# Patient Record
Sex: FEMALE | Race: WHITE | Employment: FULL TIME | URBAN - METROPOLITAN AREA
[De-identification: names, ages, dates, MRNs, and addresses within clinical notes are randomized per-mention and may not be internally consistent; named-entity substitution may affect disease eponyms.]

---

## 2018-04-27 ENCOUNTER — OFFICE VISIT (OUTPATIENT)
Dept: FAMILY MEDICINE CLINIC | Age: 48
End: 2018-04-27
Payer: COMMERCIAL

## 2018-04-27 VITALS
DIASTOLIC BLOOD PRESSURE: 68 MMHG | OXYGEN SATURATION: 98 % | HEART RATE: 78 BPM | HEIGHT: 64 IN | SYSTOLIC BLOOD PRESSURE: 120 MMHG | BODY MASS INDEX: 32.61 KG/M2 | WEIGHT: 191 LBS

## 2018-04-27 DIAGNOSIS — J45.20 MILD INTERMITTENT ASTHMA WITHOUT COMPLICATION: ICD-10-CM

## 2018-04-27 DIAGNOSIS — E16.2 HYPOGLYCEMIA: ICD-10-CM

## 2018-04-27 DIAGNOSIS — Z13.220 SCREENING CHOLESTEROL LEVEL: ICD-10-CM

## 2018-04-27 DIAGNOSIS — J30.89 CHRONIC NON-SEASONAL ALLERGIC RHINITIS, UNSPECIFIED TRIGGER: ICD-10-CM

## 2018-04-27 DIAGNOSIS — R63.5 WEIGHT GAIN, ABNORMAL: ICD-10-CM

## 2018-04-27 DIAGNOSIS — R53.82 CHRONIC FATIGUE: ICD-10-CM

## 2018-04-27 DIAGNOSIS — K62.9 FECAL INCONTINENCE DUE TO ANORECTAL DISORDER: ICD-10-CM

## 2018-04-27 DIAGNOSIS — R15.9 FECAL INCONTINENCE DUE TO ANORECTAL DISORDER: ICD-10-CM

## 2018-04-27 DIAGNOSIS — Z12.39 SCREENING FOR BREAST CANCER: ICD-10-CM

## 2018-04-27 DIAGNOSIS — K21.9 GASTROESOPHAGEAL REFLUX DISEASE WITHOUT ESOPHAGITIS: Primary | ICD-10-CM

## 2018-04-27 PROCEDURE — 99204 OFFICE O/P NEW MOD 45 MIN: CPT | Performed by: FAMILY MEDICINE

## 2018-04-27 RX ORDER — ALBUTEROL SULFATE 90 UG/1
2 AEROSOL, METERED RESPIRATORY (INHALATION) EVERY 4 HOURS PRN
Qty: 1 INHALER | Refills: 3 | Status: SHIPPED | OUTPATIENT
Start: 2018-04-27 | End: 2018-07-20 | Stop reason: SDUPTHER

## 2018-04-27 RX ORDER — OMEPRAZOLE 20 MG/1
20 CAPSULE, DELAYED RELEASE ORAL 2 TIMES DAILY
Qty: 30 CAPSULE | Refills: 3 | Status: SHIPPED | OUTPATIENT
Start: 2018-04-27 | End: 2018-07-20 | Stop reason: SDUPTHER

## 2018-04-27 RX ORDER — LORATADINE 10 MG/1
10 CAPSULE, LIQUID FILLED ORAL DAILY
Qty: 30 CAPSULE | Refills: 1 | Status: SHIPPED | OUTPATIENT
Start: 2018-04-27 | End: 2018-07-20 | Stop reason: SDUPTHER

## 2018-04-27 ASSESSMENT — ENCOUNTER SYMPTOMS
SHORTNESS OF BREATH: 0
EYES NEGATIVE: 1
HOARSE VOICE: 1
WHEEZING: 1
HEARTBURN: 1
CONSTIPATION: 0
COUGH: 0
CHOKING: 0
GLOBUS SENSATION: 1
DIARRHEA: 0
BLOOD IN STOOL: 0
ABDOMINAL PAIN: 0

## 2018-04-27 ASSESSMENT — PATIENT HEALTH QUESTIONNAIRE - PHQ9
2. FEELING DOWN, DEPRESSED OR HOPELESS: 0
1. LITTLE INTEREST OR PLEASURE IN DOING THINGS: 0
SUM OF ALL RESPONSES TO PHQ9 QUESTIONS 1 & 2: 0
SUM OF ALL RESPONSES TO PHQ QUESTIONS 1-9: 0

## 2018-06-15 ENCOUNTER — OFFICE VISIT (OUTPATIENT)
Dept: FAMILY MEDICINE CLINIC | Age: 48
End: 2018-06-15
Payer: COMMERCIAL

## 2018-06-15 ENCOUNTER — HOSPITAL ENCOUNTER (OUTPATIENT)
Age: 48
Setting detail: SPECIMEN
Discharge: HOME | End: 2018-06-15
Payer: COMMERCIAL

## 2018-06-15 VITALS
BODY MASS INDEX: 32.95 KG/M2 | HEART RATE: 72 BPM | WEIGHT: 193 LBS | HEIGHT: 64 IN | DIASTOLIC BLOOD PRESSURE: 78 MMHG | SYSTOLIC BLOOD PRESSURE: 122 MMHG

## 2018-06-15 DIAGNOSIS — N95.1 PERIMENOPAUSE: ICD-10-CM

## 2018-06-15 DIAGNOSIS — Z01.419 ENCOUNTER FOR GYNECOLOGICAL EXAMINATION WITHOUT ABNORMAL FINDING: Primary | ICD-10-CM

## 2018-06-15 DIAGNOSIS — J45.20 MILD INTERMITTENT ASTHMA WITHOUT COMPLICATION: ICD-10-CM

## 2018-06-15 DIAGNOSIS — K21.9 GASTROESOPHAGEAL REFLUX DISEASE WITHOUT ESOPHAGITIS: ICD-10-CM

## 2018-06-15 DIAGNOSIS — E66.09 CLASS 1 OBESITY DUE TO EXCESS CALORIES WITHOUT SERIOUS COMORBIDITY WITH BODY MASS INDEX (BMI) OF 33.0 TO 33.9 IN ADULT: ICD-10-CM

## 2018-06-15 DIAGNOSIS — J30.89 CHRONIC NON-SEASONAL ALLERGIC RHINITIS, UNSPECIFIED TRIGGER: ICD-10-CM

## 2018-06-15 PROCEDURE — 99396 PREV VISIT EST AGE 40-64: CPT | Performed by: FAMILY MEDICINE

## 2018-06-15 RX ORDER — MONTELUKAST SODIUM 10 MG/1
10 TABLET ORAL NIGHTLY
Qty: 30 TABLET | Refills: 3 | Status: SHIPPED | OUTPATIENT
Start: 2018-06-15 | End: 2018-07-20 | Stop reason: SDUPTHER

## 2018-06-15 ASSESSMENT — ENCOUNTER SYMPTOMS
COUGH: 0
CONSTIPATION: 0
HEARTBURN: 1
WHEEZING: 1
DIARRHEA: 0
EYES NEGATIVE: 1
BLOOD IN STOOL: 0
SHORTNESS OF BREATH: 0
ABDOMINAL PAIN: 0

## 2018-07-05 LAB — CYTOLOGY REPORT: NORMAL

## 2018-07-15 LAB
ABSOLUTE BASO #: 0.1 K/UL (ref 0–0.1)
ABSOLUTE EOS #: 0.1 K/UL (ref 0.1–0.4)
ABSOLUTE LYMPH #: 2.7 K/UL (ref 0.8–5.2)
ABSOLUTE MONO #: 0.6 K/UL (ref 0.1–0.9)
ABSOLUTE NEUT #: 3.5 K/UL (ref 1.3–9.1)
ALBUMIN SERPL-MCNC: 4.5 G/DL (ref 3.5–5.2)
ALK PHOSPHATASE: 79 U/L (ref 30–103)
ALT SERPL-CCNC: 31 U/L (ref 5–40)
ANION GAP SERPL CALCULATED.3IONS-SCNC: 13 MEQ/L (ref 10–19)
AST SERPL-CCNC: 23 U/L (ref 9–40)
BASOPHILS RELATIVE PERCENT: 0.7 %
BILIRUB SERPL-MCNC: 0.4 MG/DL
BUN BLDV-MCNC: 9 MG/DL (ref 8–23)
CALCIUM SERPL-MCNC: 9.3 MG/DL (ref 8.5–10.5)
CHLORIDE BLD-SCNC: 104 MEQ/L (ref 95–107)
CHOLESTEROL/HDL RATIO: 4.3
CHOLESTEROL: 177 MG/DL
CO2: 25 MEQ/L (ref 19–31)
CREAT SERPL-MCNC: 0.9 MG/DL (ref 0.6–1.3)
EGFR AFRICAN AMERICAN: 87.6 ML/MIN/1.73 M2
EGFR IF NONAFRICAN AMERICAN: 75.6 ML/MIN/1.73 M2
EOSINOPHILS RELATIVE PERCENT: 1.3 %
ESTRADIOL LEVEL: 32 PG/ML
FOLLICLE STIMULATING HORMONE: 73.4 IU/L
GLUCOSE: 91 MG/DL (ref 70–99)
HCT VFR BLD CALC: 45.3 % (ref 36–48)
HDLC SERPL-MCNC: 41 MG/DL
HEMOGLOBIN: 15.3 G/DL (ref 12–16)
LDL CHOLESTEROL CALCULATED: 110 MG/DL
LDL/HDL RATIO: 2.7
LH: 55.7 IU/L
LYMPHOCYTE %: 38.2 %
MCH RBC QN AUTO: 31.4 PG (ref 27–34)
MCHC RBC AUTO-ENTMCNC: 33.8 G/DL (ref 31–36)
MCV RBC AUTO: 93 FL (ref 80–100)
MONOCYTES # BLD: 9 %
NEUTROPHILS RELATIVE PERCENT: 50.5 %
PDW BLD-RTO: 13.1 % (ref 10.8–14.8)
PLATELETS: 248 K/UL (ref 150–450)
POTASSIUM SERPL-SCNC: 4.2 MEQ/L (ref 3.5–5.4)
RBC: 4.87 M/UL (ref 4–5.5)
SODIUM BLD-SCNC: 142 MEQ/L (ref 135–146)
T4 FREE: 1.04 NG/DL (ref 0.8–1.9)
TOTAL PROTEIN: 7.3 G/DL (ref 6.1–8.3)
TRIGL SERPL-MCNC: 132 MG/DL
TSH SERPL DL<=0.05 MIU/L-ACNC: 4.6 UIU/ML (ref 0.4–4.1)
VLDLC SERPL CALC-MCNC: 26 MG/DL
WBC: 7 K/UL (ref 3.7–10.8)

## 2018-07-20 ENCOUNTER — TELEPHONE (OUTPATIENT)
Dept: FAMILY MEDICINE CLINIC | Age: 48
End: 2018-07-20

## 2018-07-20 DIAGNOSIS — J30.89 CHRONIC NON-SEASONAL ALLERGIC RHINITIS, UNSPECIFIED TRIGGER: ICD-10-CM

## 2018-07-20 DIAGNOSIS — K21.9 GASTROESOPHAGEAL REFLUX DISEASE WITHOUT ESOPHAGITIS: ICD-10-CM

## 2018-07-20 DIAGNOSIS — J45.20 MILD INTERMITTENT ASTHMA WITHOUT COMPLICATION: ICD-10-CM

## 2018-07-20 RX ORDER — OMEPRAZOLE 20 MG/1
20 CAPSULE, DELAYED RELEASE ORAL 2 TIMES DAILY
Qty: 30 CAPSULE | Refills: 3 | Status: SHIPPED | OUTPATIENT
Start: 2018-07-20 | End: 2021-03-11

## 2018-07-20 RX ORDER — ALBUTEROL SULFATE 90 UG/1
2 AEROSOL, METERED RESPIRATORY (INHALATION) EVERY 4 HOURS PRN
Qty: 1 INHALER | Refills: 3 | Status: SHIPPED | OUTPATIENT
Start: 2018-07-20 | End: 2021-03-11

## 2018-07-20 RX ORDER — LORATADINE 10 MG/1
10 CAPSULE, LIQUID FILLED ORAL DAILY
Qty: 30 CAPSULE | Refills: 3 | Status: SHIPPED | OUTPATIENT
Start: 2018-07-20 | End: 2021-03-11

## 2018-07-20 RX ORDER — MONTELUKAST SODIUM 10 MG/1
10 TABLET ORAL NIGHTLY
Qty: 30 TABLET | Refills: 3 | Status: SHIPPED | OUTPATIENT
Start: 2018-07-20 | End: 2021-03-11

## 2018-07-20 NOTE — TELEPHONE ENCOUNTER
Pt states that in the past you prescribed her some medications that she had never had filled. She never picked up what was sent. She is requesting for all four of the medications listed on her med list to be refilled at [facility] Maintenance   Topic Date Due    Pneumococcal med risk (1 of 1 - PPSV23) [birth year]/1989    Flu vaccine (1) 09/01/2018    Cervical cancer screen  06/15/2021    DTaP/Tdap/Td vaccine (2 - Td) 09/04/2022    Lipid screen  07/14/2023    HIV screen  Completed             (applicable per patient's age: Cancer Screenings, Depression Screening, Fall Risk Screening, Immunizations)    LDL Calculated (mg/dL)   Date Value   07/14/2018 110     AST (U/L)   Date Value   07/14/2018 23     ALT (U/L)   Date Value   07/14/2018 31     BUN (mg/dL)   Date Value   07/14/2018 9      (goal A1C is < 7)   (goal LDL is <100) need 30-50% reduction from baseline     BP Readings from Last 3 Encounters:   06/15/18 122/78   04/27/18 120/68    (goal /80)      All Future Testing planned in [facility]:  Lab Frequency Next Occurrence   CBC Auto Differential Once 10/22/2018   Comprehensive Metabolic Panel Once 01/22/0344   Lipid Panel Once 10/22/2018   TSH with Reflex Once 10/22/2018   KEILY DIGITAL SCREEN W CAD BILATERAL Once 45/09/8485   Follicle Stimulating Hormone Once 12/25/2018   Luteinizing Hormone Once 12/25/2018   Estradiol Once 12/25/2018       Next Visit Date:  No future appointments.          Patient Active Problem List:     Hypoglycemia     Allergic rhinitis     Asthma     Obesity     Gastroesophageal reflux disease without esophagitis

## 2019-03-07 ENCOUNTER — OFFICE VISIT (OUTPATIENT)
Dept: FAMILY MEDICINE CLINIC | Age: 49
End: 2019-03-07
Payer: COMMERCIAL

## 2019-03-07 VITALS
DIASTOLIC BLOOD PRESSURE: 84 MMHG | BODY MASS INDEX: 32.27 KG/M2 | HEIGHT: 64 IN | WEIGHT: 189 LBS | SYSTOLIC BLOOD PRESSURE: 120 MMHG | HEART RATE: 84 BPM | TEMPERATURE: 99.5 F

## 2019-03-07 DIAGNOSIS — J45.20 MILD INTERMITTENT ASTHMA WITHOUT COMPLICATION: ICD-10-CM

## 2019-03-07 DIAGNOSIS — R50.9 FEVER AND CHILLS: Primary | ICD-10-CM

## 2019-03-07 DIAGNOSIS — J31.0 CHRONIC RHINITIS: ICD-10-CM

## 2019-03-07 LAB
INFLUENZA A ANTIBODY: NORMAL
INFLUENZA B ANTIBODY: NORMAL

## 2019-03-07 PROCEDURE — 99213 OFFICE O/P EST LOW 20 MIN: CPT | Performed by: FAMILY MEDICINE

## 2019-03-07 PROCEDURE — G8484 FLU IMMUNIZE NO ADMIN: HCPCS | Performed by: FAMILY MEDICINE

## 2019-03-07 PROCEDURE — 87804 INFLUENZA ASSAY W/OPTIC: CPT | Performed by: FAMILY MEDICINE

## 2019-03-07 PROCEDURE — 1036F TOBACCO NON-USER: CPT | Performed by: FAMILY MEDICINE

## 2019-03-07 PROCEDURE — G8427 DOCREV CUR MEDS BY ELIG CLIN: HCPCS | Performed by: FAMILY MEDICINE

## 2019-03-07 PROCEDURE — G8417 CALC BMI ABV UP PARAM F/U: HCPCS | Performed by: FAMILY MEDICINE

## 2019-03-07 ASSESSMENT — ENCOUNTER SYMPTOMS
DIARRHEA: 0
COUGH: 1
SORE THROAT: 1
RHINORRHEA: 1
NAUSEA: 0

## 2019-03-07 ASSESSMENT — PATIENT HEALTH QUESTIONNAIRE - PHQ9
1. LITTLE INTEREST OR PLEASURE IN DOING THINGS: 0
SUM OF ALL RESPONSES TO PHQ QUESTIONS 1-9: 0
SUM OF ALL RESPONSES TO PHQ9 QUESTIONS 1 & 2: 0
SUM OF ALL RESPONSES TO PHQ QUESTIONS 1-9: 0
2. FEELING DOWN, DEPRESSED OR HOPELESS: 0

## 2021-03-10 ENCOUNTER — NURSE TRIAGE (OUTPATIENT)
Dept: OTHER | Facility: CLINIC | Age: 51
End: 2021-03-10

## 2021-03-10 NOTE — TELEPHONE ENCOUNTER
Patient called [facility] pre-service center [facility])  with red flag complaint. Brief description of triage: Pt calls in reporting episodes of vertigo this morning as well as head and ear \"fullness\". Triage indicates for patient to be seen today. Care advice provided, patient verbalizes understanding; denies any other questions or concerns; instructed to call back for any new or worsening symptoms. Writer provided warm transfer to Erendira Keita at [facility] for appointment scheduling. Attention Provider: Thank you for allowing me to participate in the care of your patient. The patient was connected to triage in response to information provided to the [facility]. Please do not respond through this encounter as the response is not directed to a shared pool. Reason for Disposition   Patient wants to be seen    Answer Assessment - Initial Assessment Questions  1. DESCRIPTION: \"Describe your dizziness. \"      Pt describes that this morning at 0300 she attempted to get up and felt very dizzy. Since then pt has had intermittent spells of vertigo. Pt reports her allergies are flared up right now and her ears feel congested. 2. LIGHTHEADED: \"Do you feel lightheaded? \" (e.g., somewhat faint, woozy, weak upon standing)      Denies    3. VERTIGO: \"Do you feel like either you or the room is spinning or tilting? \" (i.e. vertigo)      Yes, pt reports 5 years ago she had severe vertigo and was treated with medication    4. SEVERITY: \"How bad is it? \"  \"Do you feel like you are going to faint? \" \"Can you stand and walk? \"    - MILD - walking normally    - MODERATE - interferes with normal activities (e.g., work, school)     - SEVERE - unable to stand, requires support to walk, feels like passing out now. Mild currently    5. ONSET:  \"When did the dizziness begin? \"      This morning    6. AGGRAVATING FACTORS: \"Does anything make it worse? \" (e.g., standing, change in head position)      Movement such as putting head back in the shower, getting up and down from sitting position, closing eyes    7. HEART RATE: \"Can you tell me your heart rate? \" \"How many beats in 15 seconds? \"  (Note: not all patients can do this)        Not asked    8. CAUSE: \"What do you think is causing the dizziness? \"      Vertigo, possibly related to an ear infection    9. RECURRENT SYMPTOM: \"Have you had dizziness before? \" If so, ask: \"When was the last time? \" \"What happened that time? \"      See above    10. OTHER SYMPTOMS: \"Do you have any other symptoms? \" (e.g., fever, chest pain, vomiting, diarrhea, bleeding)        Denies all listed. Pt reports that her head feels \"congested\" but not sinus congestion, more like \"air\" per pt report. 11. PREGNANCY: \"Is there any chance you are pregnant? \" \"When was your last menstrual period? \"        Denies    Pt reports she needs a refill of Zyzal    Protocols used: [id number]

## 2021-03-11 ENCOUNTER — OFFICE VISIT (OUTPATIENT)
Dept: FAMILY MEDICINE CLINIC | Age: 51
End: 2021-03-11
Payer: COMMERCIAL

## 2021-03-11 VITALS
BODY MASS INDEX: 33.63 KG/M2 | DIASTOLIC BLOOD PRESSURE: 80 MMHG | WEIGHT: 197 LBS | RESPIRATION RATE: 18 BRPM | OXYGEN SATURATION: 98 % | HEIGHT: 64 IN | TEMPERATURE: 98.1 F | SYSTOLIC BLOOD PRESSURE: 124 MMHG | HEART RATE: 72 BPM

## 2021-03-11 DIAGNOSIS — R42 DIZZINESS: ICD-10-CM

## 2021-03-11 DIAGNOSIS — H83.03 LABYRINTHITIS OF BOTH EARS: ICD-10-CM

## 2021-03-11 DIAGNOSIS — J30.89 NON-SEASONAL ALLERGIC RHINITIS, UNSPECIFIED TRIGGER: Primary | ICD-10-CM

## 2021-03-11 PROCEDURE — G8484 FLU IMMUNIZE NO ADMIN: HCPCS | Performed by: FAMILY MEDICINE

## 2021-03-11 PROCEDURE — 3017F COLORECTAL CA SCREEN DOC REV: CPT | Performed by: FAMILY MEDICINE

## 2021-03-11 PROCEDURE — 1036F TOBACCO NON-USER: CPT | Performed by: FAMILY MEDICINE

## 2021-03-11 PROCEDURE — G8419 CALC BMI OUT NRM PARAM NOF/U: HCPCS | Performed by: FAMILY MEDICINE

## 2021-03-11 PROCEDURE — G8427 DOCREV CUR MEDS BY ELIG CLIN: HCPCS | Performed by: FAMILY MEDICINE

## 2021-03-11 PROCEDURE — 99213 OFFICE O/P EST LOW 20 MIN: CPT | Performed by: FAMILY MEDICINE

## 2021-03-11 RX ORDER — LEVOCETIRIZINE DIHYDROCHLORIDE 5 MG/1
5 TABLET, FILM COATED ORAL NIGHTLY
Qty: 30 TABLET | Refills: 5 | Status: SHIPPED | OUTPATIENT
Start: 2021-03-11 | End: 2021-10-28

## 2021-03-11 RX ORDER — AMOXICILLIN 875 MG/1
875 TABLET, COATED ORAL 2 TIMES DAILY
Qty: 20 TABLET | Refills: 0 | Status: SHIPPED | OUTPATIENT
Start: 2021-03-11 | End: 2021-03-21

## 2021-03-11 RX ORDER — MECLIZINE HYDROCHLORIDE 25 MG/1
25 TABLET ORAL 3 TIMES DAILY PRN
Qty: 30 TABLET | Refills: 0 | Status: SHIPPED | OUTPATIENT
Start: 2021-03-11 | End: 2021-03-21

## 2021-03-11 SDOH — ECONOMIC STABILITY: FOOD INSECURITY: WITHIN THE PAST 12 MONTHS, YOU WORRIED THAT YOUR FOOD WOULD RUN OUT BEFORE YOU GOT MONEY TO BUY MORE.: NEVER TRUE

## 2021-03-11 SDOH — ECONOMIC STABILITY: FOOD INSECURITY: WITHIN THE PAST 12 MONTHS, THE FOOD YOU BOUGHT JUST DIDN'T LAST AND YOU DIDN'T HAVE MONEY TO GET MORE.: NEVER TRUE

## 2021-03-11 SDOH — ECONOMIC STABILITY: TRANSPORTATION INSECURITY
IN THE PAST 12 MONTHS, HAS LACK OF TRANSPORTATION KEPT YOU FROM MEETINGS, WORK, OR FROM GETTING THINGS NEEDED FOR DAILY LIVING?: NO

## 2021-03-11 SDOH — ECONOMIC STABILITY: TRANSPORTATION INSECURITY
IN THE PAST 12 MONTHS, HAS THE LACK OF TRANSPORTATION KEPT YOU FROM MEDICAL APPOINTMENTS OR FROM GETTING MEDICATIONS?: NO

## 2021-03-11 SDOH — ECONOMIC STABILITY: INCOME INSECURITY: HOW HARD IS IT FOR YOU TO PAY FOR THE VERY BASICS LIKE FOOD, HOUSING, MEDICAL CARE, AND HEATING?: NOT HARD AT ALL

## 2021-03-11 ASSESSMENT — ENCOUNTER SYMPTOMS
ALLERGIC/IMMUNOLOGIC NEGATIVE: 1
SINUS PRESSURE: 1
SHORTNESS OF BREATH: 0
EYES NEGATIVE: 1
COUGH: 0
ABDOMINAL PAIN: 0
CONSTIPATION: 0
DIARRHEA: 0

## 2021-03-11 ASSESSMENT — PATIENT HEALTH QUESTIONNAIRE - PHQ9
SUM OF ALL RESPONSES TO PHQ QUESTIONS 1-9: 0
1. LITTLE INTEREST OR PLEASURE IN DOING THINGS: 0
SUM OF ALL RESPONSES TO PHQ9 QUESTIONS 1 & 2: 0

## 2021-03-11 NOTE — PROGRESS NOTES
Depression screening done  Chief Complaint   Patient presents with    Dizziness     03/10/21 when getting up or puts her back.      Otalgia     ear pressure for a week

## 2021-03-11 NOTE — PROGRESS NOTES
[facility]  [facility]  [address]  [facility] [address]  Dept: [phone]    3/11/2021    CHIEF COMPLAINT    Chief Complaint   Patient presents with    Dizziness     03/10/21 when getting up or puts her back.  Otalgia     ear pressure for a week        CYRUS Gray is a 48 y.o. female who presents   Chief Complaint   Patient presents with    Dizziness     03/10/21 when getting up or puts her back.  Otalgia     ear pressure for a week    . Dizziness  This is a new problem. The current episode started yesterday. The problem occurs intermittently. The problem has been unchanged. Associated symptoms include congestion. Pertinent negatives include no abdominal pain, chest pain, coughing, fatigue, fever or headaches. The symptoms are aggravated by standing and bending. She has tried nothing for the symptoms. The treatment provided mild relief. Otalgia   There is pain in both ears. This is a new problem. The current episode started yesterday. The problem occurs constantly. The problem has been unchanged. There has been no fever. Pertinent negatives include no abdominal pain, coughing, diarrhea, ear discharge, headaches or hearing loss. Vitals:    03/11/21 1152   BP: 124/80   Site: Left Upper Arm   Position: Sitting   Cuff Size: Medium Adult   Pulse: 72   Resp: 18   Temp: 98.1 °F (36.7 °C)   TempSrc: Temporal   SpO2: 98%   Weight: 197 lb (89.4 kg)   Height: 5' 4\" (1.626 m)       REVIEW OF SYSTEMS    Review of Systems   Constitutional: Negative for fatigue, fever and unexpected weight change. HENT: Positive for congestion, ear pain and sinus pressure. Negative for ear discharge and hearing loss. Eyes: Negative. Respiratory: Negative for cough and shortness of breath. Cardiovascular: Negative for chest pain and leg swelling. Gastrointestinal: Negative for abdominal pain, constipation and diarrhea.    Endocrine: Concern    None   Social History Narrative    None       SURGICAL HISTORY    Past Surgical History:   Procedure Laterality Date    DILATION AND CURETTAGE OF UTERUS  1996    spab       CURRENT MEDICATIONS    Current Outpatient Medications   Medication Sig Dispense Refill    amoxicillin (AMOXIL) 875 MG tablet Take 1 tablet by mouth 2 times daily for 10 days 20 tablet 0    levocetirizine (XYZAL) 5 MG tablet Take 1 tablet by mouth nightly 30 tablet 5    meclizine (ANTIVERT) 25 MG tablet Take 1 tablet by mouth 3 times daily as needed for Dizziness 30 tablet 0     No current facility-administered medications for this visit. ALLERGIES    No Known Allergies    PHYSICAL EXAM   Physical Exam  Vitals signs reviewed. Constitutional:       Appearance: She is well-developed. She is obese. HENT:      Head: Normocephalic. Right Ear: A middle ear effusion is present. Left Ear: A middle ear effusion is present. Nose: Congestion present. Right Sinus: No maxillary sinus tenderness or frontal sinus tenderness. Left Sinus: No maxillary sinus tenderness or frontal sinus tenderness. Eyes:      Conjunctiva/sclera: Conjunctivae normal.      Pupils: Pupils are equal, round, and reactive to light. Neck:      Musculoskeletal: Normal range of motion and neck supple. Thyroid: No thyromegaly. Cardiovascular:      Rate and Rhythm: Normal rate and regular rhythm. Heart sounds: Normal heart sounds. No murmur. Pulmonary:      Effort: Pulmonary effort is normal.      Breath sounds: Normal breath sounds. No wheezing or rales. Abdominal:      Palpations: Abdomen is soft. Musculoskeletal: Normal range of motion. General: No tenderness or deformity. Lymphadenopathy:      Cervical: No cervical adenopathy. Skin:     General: Skin is warm and dry. Neurological:      Mental Status: She is alert and oriented to person, place, and time.    Psychiatric:         Mood and Affect: Mood

## 2021-04-16 ENCOUNTER — HOSPITAL ENCOUNTER (OUTPATIENT)
Age: 51
Setting detail: SPECIMEN
Discharge: HOME | End: 2021-04-16
Payer: COMMERCIAL

## 2021-04-16 ENCOUNTER — OFFICE VISIT (OUTPATIENT)
Dept: FAMILY MEDICINE CLINIC | Age: 51
End: 2021-04-16
Payer: COMMERCIAL

## 2021-04-16 VITALS
HEART RATE: 92 BPM | RESPIRATION RATE: 16 BRPM | TEMPERATURE: 97.4 F | SYSTOLIC BLOOD PRESSURE: 118 MMHG | WEIGHT: 197.2 LBS | OXYGEN SATURATION: 98 % | HEIGHT: 64 IN | BODY MASS INDEX: 33.67 KG/M2 | DIASTOLIC BLOOD PRESSURE: 78 MMHG

## 2021-04-16 DIAGNOSIS — J45.20 MILD INTERMITTENT ASTHMA WITHOUT COMPLICATION: ICD-10-CM

## 2021-04-16 DIAGNOSIS — Z13.228 SCREENING FOR ENDOCRINE, METABOLIC AND IMMUNITY DISORDER: ICD-10-CM

## 2021-04-16 DIAGNOSIS — M25.562 CHRONIC PAIN OF LEFT KNEE: ICD-10-CM

## 2021-04-16 DIAGNOSIS — G89.29 CHRONIC PAIN OF LEFT KNEE: ICD-10-CM

## 2021-04-16 DIAGNOSIS — Z13.0 SCREENING FOR ENDOCRINE, METABOLIC AND IMMUNITY DISORDER: ICD-10-CM

## 2021-04-16 DIAGNOSIS — K21.9 GASTROESOPHAGEAL REFLUX DISEASE WITHOUT ESOPHAGITIS: ICD-10-CM

## 2021-04-16 DIAGNOSIS — Z13.29 SCREENING FOR ENDOCRINE, METABOLIC AND IMMUNITY DISORDER: ICD-10-CM

## 2021-04-16 DIAGNOSIS — R63.5 WEIGHT GAIN: ICD-10-CM

## 2021-04-16 DIAGNOSIS — R79.89 ELEVATED TSH: ICD-10-CM

## 2021-04-16 DIAGNOSIS — Z00.00 ANNUAL PHYSICAL EXAM: ICD-10-CM

## 2021-04-16 DIAGNOSIS — Z12.11 SCREEN FOR COLON CANCER: ICD-10-CM

## 2021-04-16 DIAGNOSIS — Z13.220 SCREENING FOR LIPID DISORDERS: ICD-10-CM

## 2021-04-16 DIAGNOSIS — E66.09 CLASS 1 OBESITY DUE TO EXCESS CALORIES WITHOUT SERIOUS COMORBIDITY WITH BODY MASS INDEX (BMI) OF 33.0 TO 33.9 IN ADULT: ICD-10-CM

## 2021-04-16 DIAGNOSIS — Z00.00 ANNUAL PHYSICAL EXAM: Primary | ICD-10-CM

## 2021-04-16 DIAGNOSIS — J34.89 NASAL SORE: ICD-10-CM

## 2021-04-16 DIAGNOSIS — J30.89 NON-SEASONAL ALLERGIC RHINITIS, UNSPECIFIED TRIGGER: ICD-10-CM

## 2021-04-16 DIAGNOSIS — Z12.31 ENCOUNTER FOR SCREENING MAMMOGRAM FOR MALIGNANT NEOPLASM OF BREAST: ICD-10-CM

## 2021-04-16 PROCEDURE — 36415 COLL VENOUS BLD VENIPUNCTURE: CPT | Performed by: FAMILY MEDICINE

## 2021-04-16 PROCEDURE — 99396 PREV VISIT EST AGE 40-64: CPT | Performed by: FAMILY MEDICINE

## 2021-04-16 ASSESSMENT — ENCOUNTER SYMPTOMS
ALLERGIC/IMMUNOLOGIC NEGATIVE: 1
SHORTNESS OF BREATH: 1
CONSTIPATION: 0
ABDOMINAL PAIN: 0
CHEST TIGHTNESS: 1
EYES NEGATIVE: 1
DIARRHEA: 0
BLOOD IN STOOL: 0
COUGH: 0

## 2021-04-16 ASSESSMENT — PATIENT HEALTH QUESTIONNAIRE - PHQ9
SUM OF ALL RESPONSES TO PHQ QUESTIONS 1-9: 0
SUM OF ALL RESPONSES TO PHQ QUESTIONS 1-9: 0
2. FEELING DOWN, DEPRESSED OR HOPELESS: 0
SUM OF ALL RESPONSES TO PHQ QUESTIONS 1-9: 0
SUM OF ALL RESPONSES TO PHQ9 QUESTIONS 1 & 2: 0

## 2021-04-16 NOTE — PROGRESS NOTES
[facility]  [facility]  [address]  [facility] [address]  Dept: [phone]    4/16/2021    CHIEF COMPLAINT    Chief Complaint   Patient presents with    Annual Exam    Weight Management     doesn't like her weight    Knee Pain     both knees hurt       HPI    Gilson Drake is a 48 y.o. female who presents   Chief Complaint   Patient presents with    Annual Exam    Weight Management     doesn't like her weight    Knee Pain     both knees hurt   . Here for annual physical exam.  Has multiple concerns including inability to lose weight, chronic left knee pain which is worsening, allergies with postnasal drip and tightness in her chest.  Takes Xyzal which is helpful but makes her sleepy. Has used albuterol but it does not seem to be effective. History of GERD not currently on medication. Labs done several years ago showed an elevated TSH and normal free T4. She has had normal lipids in the past and normal blood sugar in the past.  Working as a psychologist.  Still has teenaged children at home. Parents are elderly and her father currently is on a ventilator for Covid infection. She has been tested for Covid and was negative. Has been menopausal for at least 3 years. Last Pap was in 2018. Due for mammogram and colon screening. Vitals:    04/16/21 0829   BP: 118/78   Site: Left Upper Arm   Position: Sitting   Cuff Size: Medium Adult   Pulse: 92   Resp: 16   Temp: 97.4 °F (36.3 °C)   TempSrc: Temporal   SpO2: 98%   Weight: 197 lb 3.2 oz (89.4 kg)   Height: 5' 4\" (1.626 m)       REVIEW OF SYSTEMS    Review of Systems   Constitutional: Positive for unexpected weight change. Negative for fatigue and fever. HENT: Positive for congestion and postnasal drip. Recurrent sores in her nose that seem to migrate from place to place. She has some pustular discharge from them at times. Eyes: Negative.     Respiratory: Positive for chest tightness and shortness of breath. Negative for cough. Mild symptoms of chest tightness and sometimes feeling like she cannot get a full breath. Has tried albuterol without relief. Cardiovascular: Negative for chest pain, palpitations and leg swelling. Gastrointestinal: Negative for abdominal pain, blood in stool, constipation and diarrhea. Endocrine: Negative. Genitourinary: Negative for frequency, urgency and vaginal bleeding. Musculoskeletal: Positive for arthralgias (bilat knee pain). Left knee pain, worsening. Painful going up steps   Skin: Negative. Allergic/Immunologic: Negative. Neurological: Negative for dizziness and headaches. Hematological: Negative. Psychiatric/Behavioral: Negative for sleep disturbance. The patient is not nervous/anxious.         PAST MEDICAL HISTORY    Past Medical History:   Diagnosis Date    Allergic rhinitis     Asthma     Gastroesophageal reflux disease without esophagitis     Hypoglycemia     Obesity        FAMILY HISTORY    Family History   Problem Relation Age of Onset    Diabetes Mother     Coronary Art Dis Mother     High Blood Pressure Mother     Other Mother         covid    COPD Mother         nonsmoker    COPD Father         smoker    High Blood Pressure Father     Vision Loss Father         Fuchs dystrophy of eye    Other Father         covid, on vent 4/2021, Fuchs dystrophy of eye    Other Sister         Fuchs dystrophy of eye    Cancer Brother         basal and squamous    Asthma Daughter     Alcohol Abuse Brother         covid    Other Brother         Fuchs dystrophy of eye       SOCIAL HISTORY    Social History     Socioeconomic History    Marital status:      Spouse name: None    Number of children: None    Years of education: None    Highest education level: None   Occupational History    Occupation: clinical therapist   Social Needs    Financial resource strain: Not hard at all   T[place]-Colette insecurity     Worry: Never true     Inability: Never true    Transportation needs     Medical: No     Non-medical: No   Tobacco Use    Smoking status: Never Smoker    Smokeless tobacco: Never Used   Substance and Sexual Activity    Alcohol use: No    Drug use: No    Sexual activity: Yes     Partners: Male   Lifestyle    Physical activity     Days per week: None     Minutes per session: None    Stress: None   Relationships    Social connections     Talks on phone: None     Gets together: None     Attends [employer] service: None     Active member of club or organization: None     Attends meetings of clubs or organizations: None     Relationship status: None    Intimate partner violence     Fear of current or ex partner: None     Emotionally abused: None     Physically abused: None     Forced sexual activity: None   Other Topics Concern    None   Social History Narrative    None       SURGICAL HISTORY    Past Surgical History:   Procedure Laterality Date    DILATION AND CURETTAGE OF UTERUS  1996    spab       CURRENT MEDICATIONS    Current Outpatient Medications   Medication Sig Dispense Refill    mupirocin (BACTROBAN) 2 % ointment Apply 3 times daily. 15 g 1    levocetirizine (XYZAL) 5 MG tablet Take 1 tablet by mouth nightly 30 tablet 5     No current facility-administered medications for this visit. ALLERGIES    No Known Allergies    PHYSICAL EXAM   Physical Exam  Vitals signs reviewed. Constitutional:       Appearance: She is well-developed. She is obese. HENT:      Head: Normocephalic. Eyes:      Conjunctiva/sclera: Conjunctivae normal.      Pupils: Pupils are equal, round, and reactive to light. Neck:      Musculoskeletal: Normal range of motion and neck supple. Thyroid: No thyromegaly. Cardiovascular:      Rate and Rhythm: Normal rate and regular rhythm. Heart sounds: Normal heart sounds. No murmur.    Pulmonary:      Effort: Pulmonary effort is normal.      Breath sounds: Normal breath sounds. No wheezing or rales. Abdominal:      Palpations: Abdomen is soft. Tenderness: There is no abdominal tenderness. There is no guarding or rebound. Musculoskeletal: Normal range of motion. General: Tenderness (  Left knee, global) present. No deformity. Lymphadenopathy:      Cervical: No cervical adenopathy. Skin:     General: Skin is warm and dry. Neurological:      Mental Status: She is alert and oriented to person, place, and time. Psychiatric:         Mood and Affect: Mood normal.         Behavior: Behavior normal.         Thought Content: Thought content normal.         Judgment: Judgment normal.         ASSESSMENT/PLAN  1. Annual physical exam  Check labs, colon screening breast screening. Continue trying to follow healthy diet and get regular exercise  - Comprehensive Metabolic Panel; Future  - Lipid Panel; Future    2. Encounter for screening mammogram for malignant neoplasm of breast    - KEILY DIGITAL SCREEN W OR WO CAD BILATERAL; Future    3. Screen for colon cancer    - Cologuard; Future    4. Elevated TSH  Recheck levels and start supplement if indicated  - T4, Free; Future  - TSH without Reflex; Future    5. Chronic pain of left knee  Continue to limit activity as needed and see orthopedist  - [address] [place] [address] and Sports Medicine    6. Non-seasonal allergic rhinitis, unspecified trigger  Suggested trying a different antihistamine due to drowsiness from Xyzal.    7. Mild intermittent asthma without complication  Currently stable. Albuterol not helpful. Consider referral to pulmonologist if symptoms persist or worsen    8. Gastroesophageal reflux disease without esophagitis  Given patient education on reflux precautions. She did not want to take any medication    9. Class 1 obesity due to excess calories without serious comorbidity with body mass index (BMI) of 33.0 to 33.9 in adult  Continue exercise efforts    10.  Weight gain  Checking labs  - Comprehensive Metabolic Panel; Future    11. Screening for lipid disorders    - Lipid Panel; Future    12. Screening for endocrine, metabolic and immunity disorder    - Comprehensive Metabolic Panel; Future    13. Nasal sore  If not improving may try oral antibiotic and/or referral to ENT  - mupirocin (BACTROBAN) 2 % ointment; Apply 3 times daily. Dispense: 15 g; Refill: 1       Taya received counseling on the following healthy behaviors: nutrition, exercise and medication adherence  Reviewed prior labs and health maintenance. Continue current medications, diet and exercise. Discussed use, benefit, and side effects of prescribed medications. Barriers to medication compliance addressed. Patient given educational materials - see patient instructions. All patient questions answered. Patient voiced understanding.       Return for gyn exam.        Electronically signed by Josy Dong MD on 4/16/21 at 8:39 AM EDT

## 2021-04-16 NOTE — PROGRESS NOTES
Depression screening done  Nose sores, raspy voice and she feels her lungs. Has trouble swallowing, food gets caught.  Snores a lot   Chief Complaint   Patient presents with    Annual Exam    Weight Management     doesn't like her weight    Knee Pain     both knees hurt

## 2021-04-17 LAB
ALBUMIN SERPL-MCNC: 4.5 G/DL (ref 3.5–5.2)
ALBUMIN/GLOBULIN RATIO: 1.6 (ref 1–2.5)
ALP BLD-CCNC: 69 U/L (ref 35–104)
ALT SERPL-CCNC: 24 U/L (ref 5–33)
ANION GAP SERPL CALCULATED.3IONS-SCNC: 13 MMOL/L (ref 9–17)
AST SERPL-CCNC: 25 U/L
BILIRUB SERPL-MCNC: 0.28 MG/DL (ref 0.3–1.2)
BUN BLDV-MCNC: 10 MG/DL (ref 6–20)
BUN/CREAT BLD: ABNORMAL (ref 9–20)
CALCIUM SERPL-MCNC: 9.2 MG/DL (ref 8.6–10.4)
CHLORIDE BLD-SCNC: 110 MMOL/L (ref 98–107)
CHOLESTEROL/HDL RATIO: 3.5
CHOLESTEROL: 172 MG/DL
CO2: 21 MMOL/L (ref 20–31)
CREAT SERPL-MCNC: 0.76 MG/DL (ref 0.5–0.9)
GFR AFRICAN AMERICAN: >60 ML/MIN
GFR NON-AFRICAN AMERICAN: >60 ML/MIN
GFR SERPL CREATININE-BSD FRML MDRD: ABNORMAL ML/MIN/{1.73_M2}
GFR SERPL CREATININE-BSD FRML MDRD: ABNORMAL ML/MIN/{1.73_M2}
GLUCOSE BLD-MCNC: 90 MG/DL (ref 70–99)
HDLC SERPL-MCNC: 49 MG/DL
LDL CHOLESTEROL: 106 MG/DL (ref 0–130)
POTASSIUM SERPL-SCNC: 4.5 MMOL/L (ref 3.7–5.3)
SODIUM BLD-SCNC: 144 MMOL/L (ref 135–144)
THYROXINE, FREE: 1.09 NG/DL (ref 0.93–1.7)
TOTAL PROTEIN: 7.4 G/DL (ref 6.4–8.3)
TRIGL SERPL-MCNC: 83 MG/DL
TSH SERPL DL<=0.05 MIU/L-ACNC: 3.85 MIU/L (ref 0.3–5)
VLDLC SERPL CALC-MCNC: NORMAL MG/DL (ref 1–30)

## 2021-05-03 DIAGNOSIS — Z12.11 SCREEN FOR COLON CANCER: ICD-10-CM

## 2021-08-10 ENCOUNTER — TELEPHONE (OUTPATIENT)
Dept: FAMILY MEDICINE CLINIC | Age: 51
End: 2021-08-10

## 2021-08-10 DIAGNOSIS — Z12.39 ENCOUNTER FOR SCREENING FOR MALIGNANT NEOPLASM OF BREAST, UNSPECIFIED SCREENING MODALITY: Primary | ICD-10-CM

## 2021-08-10 NOTE — TELEPHONE ENCOUNTER
----- Message from Heather Ramirez sent at 8/10/2021  2:00 PM EDT -----  Subject: Referral Request    QUESTIONS   Reason for referral request? patient had script order for Mammogram   21  21. Please place new order for Mammogram and fax over   to [facility] please fax [phone]. Has the physician seen you for this condition before? No   Preferred Specialist (if applicable)? Do you already have an appointment scheduled? No  Additional Information for Provider?   ---------------------------------------------------------------------------  --------------  CALL BACK INFO  What is the best way for the office to contact you? OK to leave message on   voicemail  Preferred Call Back Phone Number?  [phone]

## 2021-09-17 ENCOUNTER — TELEPHONE (OUTPATIENT)
Dept: FAMILY MEDICINE CLINIC | Age: 51
End: 2021-09-17

## 2021-09-17 DIAGNOSIS — S99.922A TOE INJURY, LEFT, INITIAL ENCOUNTER: Primary | ICD-10-CM

## 2021-09-21 DIAGNOSIS — Z12.39 ENCOUNTER FOR SCREENING FOR MALIGNANT NEOPLASM OF BREAST, UNSPECIFIED SCREENING MODALITY: ICD-10-CM

## 2021-09-22 ENCOUNTER — TELEPHONE (OUTPATIENT)
Dept: FAMILY MEDICINE CLINIC | Age: 51
End: 2021-09-22

## 2021-09-22 DIAGNOSIS — S99.922A TOE INJURY, LEFT, INITIAL ENCOUNTER: Primary | ICD-10-CM

## 2021-09-22 DIAGNOSIS — S99.922A TOE INJURY, LEFT, INITIAL ENCOUNTER: ICD-10-CM

## 2021-09-22 NOTE — TELEPHONE ENCOUNTER
lmovm for the patient to call the office.    Will give results when she calls back     The results are scanned into the chart

## 2021-09-23 NOTE — TELEPHONE ENCOUNTER
Pt called back regarding message:  Test results: 9.17.2021  Left  Toes 3 v Left. Pt stated:   Please, place referral based on your recommendation. You stated: Podiatrist or [facility] Orth[facility] on results.

## 2021-10-28 ENCOUNTER — OFFICE VISIT (OUTPATIENT)
Dept: PODIATRY | Age: 51
End: 2021-10-28
Payer: COMMERCIAL

## 2021-10-28 VITALS — WEIGHT: 197 LBS | RESPIRATION RATE: 16 BRPM | HEIGHT: 64 IN | BODY MASS INDEX: 33.63 KG/M2

## 2021-10-28 DIAGNOSIS — M25.572 TOE JOINT PAIN, LEFT: Primary | ICD-10-CM

## 2021-10-28 PROCEDURE — 3017F COLORECTAL CA SCREEN DOC REV: CPT | Performed by: PODIATRIST

## 2021-10-28 PROCEDURE — 1036F TOBACCO NON-USER: CPT | Performed by: PODIATRIST

## 2021-10-28 PROCEDURE — G8427 DOCREV CUR MEDS BY ELIG CLIN: HCPCS | Performed by: PODIATRIST

## 2021-10-28 PROCEDURE — G8417 CALC BMI ABV UP PARAM F/U: HCPCS | Performed by: PODIATRIST

## 2021-10-28 PROCEDURE — G8484 FLU IMMUNIZE NO ADMIN: HCPCS | Performed by: PODIATRIST

## 2021-10-28 PROCEDURE — 99203 OFFICE O/P NEW LOW 30 MIN: CPT | Performed by: PODIATRIST

## 2021-10-28 NOTE — PROGRESS NOTES
[facility]  [facility]  [address]  Dept: [phone]  Dept Fax: [phone]    NEW PATIENT PROGRESS NOTE  Date of patient's visit: 10/28/2021  Patient's Name:  Emmanuel Damon YOB: 1970            Patient Care Team:  Germaine Hays MD as PCP - General (Family Medicine)  Germaine Hays MD as PCP - [facility] Provider  Maximino Vieira DPM as Physician (Podiatry)        Chief Complaint   Patient presents with    New Patient    Toe Pain     left foot all toes x2 months         HPI:   Emmanuel Damon is a 46 y.o. female who presents to the office today complaining of left foot multiple toe injury. Symptoms began 2 month(s) ago. Patient relates pain is present. Pain is rated 1 out of 10 and is described as intermittent, mild. Treatments prior to today's visit include: x rays shoe modification   Currently denies F/C/N/V.  Pt's primary care physician is Germaine Hays MD last seen 4/16/2021     No Known Allergies    Past Medical History:   Diagnosis Date    Allergic rhinitis     Asthma     Gastroesophageal reflux disease without esophagitis     Hypoglycemia     Obesity        Prior to Admission medications    Not on File       Past Surgical History:   Procedure Laterality Date    DILATION AND CURETTAGE OF UTERUS  1996    spab       Family History   Problem Relation Age of Onset    Diabetes Mother     Coronary Art Dis Mother     High Blood Pressure Mother     Other Mother         covid    COPD Mother         nonsmoker    COPD Father         smoker    High Blood Pressure Father     Vision Loss Father         Fuchs dystrophy of eye    Other Father         covid, on vent 4/2021, Fuchs dystrophy of eye    Other Sister         Fuchs dystrophy of eye    Cancer Brother         basal and squamous    Asthma Daughter     Alcohol Abuse Brother         covid    Other Brother         Fuchs dystrophy of eye Social History     Tobacco Use    Smoking status: Never Smoker    Smokeless tobacco: Never Used   Substance Use Topics    Alcohol use: No       Review of Systems    Review of Systems:   History obtained from chart review and the patient  General ROS: negative for - chills, fatigue, fever, night sweats or weight gain  Constitutional: Negative for chills, diaphoresis, fatigue, fever and unexpected weight change. Musculoskeletal: Positive for arthralgias, gait problem and joint swelling. Neurological ROS: negative for - behavioral changes, confusion, headaches or seizures. Negative for weakness and numbness. Dermatological ROS: negative for - mole changes, rash  Cardiovascular: Negative for leg swelling. Gastrointestinal: Negative for constipation, diarrhea, nausea and vomiting. Lower Extremity Physical Examination:   Vitals:   Vitals:    10/28/21 1338   Resp: 16     General: AAO x 3 in NAD. Dermatologic Exam:  Skin lesion/ulceration Absent . Skin No rashes or nodules noted. .       Musculoskeletal:     1st MPJ ROM decreased, Bilateral.  Muscle strength 5/5, Bilateral.  Pain present upon palpation of left 3-4 toes at the PIPJ with pain on flexion and dorsiflexion . No edema present no ecchmosis . Medial longitudinal arch, Bilateral WNL.   Ankle ROM WNL,Bilateral.    Dorsally contracted digits absent digits 1-5 Bilateral.     Vascular: DP and PT pulses palpable 2/4, Bilateral.  CFT <3 seconds, Bilateral.  Hair growth present to the level of the digits, Bilateral.  Edema absent, Bilateral.  Varicosities absent, Bilateral. Erythema absent, Bilateral    Neurological: Sensation intact to light touch to level of digits, Bilateral.  Protective sensation intact 10/10 sites via 5.07/10g [place] Monofilament, Bilateral.  negative Tinel's, Bilateral.  negative Valleix sign, Bilateral.      Integument: Warm, dry, supple, Bilateral.  Open lesion absent, Bilateral.  Interdigital maceration

## 2022-03-31 ENCOUNTER — TELEPHONE (OUTPATIENT)
Dept: FAMILY MEDICINE CLINIC | Age: 52
End: 2022-03-31

## 2022-03-31 NOTE — TELEPHONE ENCOUNTER
Pts  called in stating that he dropped off some paperwork for his wife yesterday to get filled out and signed. Pt states his wife is going out of town and needs this paper work and her immunizations records I did advise him that any paperwork takes 7-10 days to get filled out. Pt stated he was no told that and that the have already paid for there flight tickets and needs the paperwork filled out.     Please advise

## 2023-04-03 ENCOUNTER — TELEPHONE (OUTPATIENT)
Dept: FAMILY MEDICINE CLINIC | Age: 53
End: 2023-04-03

## 2023-04-03 NOTE — TELEPHONE ENCOUNTER
Patient called in stating that she went into the hospital for SOB and she has a bad cough and she wants to know if something could be called in to help her sx if possible        Please advise

## 2023-04-06 ENCOUNTER — OFFICE VISIT (OUTPATIENT)
Dept: FAMILY MEDICINE CLINIC | Age: 53
End: 2023-04-06
Payer: COMMERCIAL

## 2023-04-06 VITALS
SYSTOLIC BLOOD PRESSURE: 116 MMHG | HEART RATE: 79 BPM | DIASTOLIC BLOOD PRESSURE: 73 MMHG | TEMPERATURE: 97.2 F | BODY MASS INDEX: 35.1 KG/M2 | WEIGHT: 205.6 LBS | HEIGHT: 64 IN | OXYGEN SATURATION: 98 %

## 2023-04-06 DIAGNOSIS — K21.9 GASTROESOPHAGEAL REFLUX DISEASE WITHOUT ESOPHAGITIS: Primary | ICD-10-CM

## 2023-04-06 DIAGNOSIS — J45.40 MODERATE PERSISTENT ASTHMA WITHOUT COMPLICATION: ICD-10-CM

## 2023-04-06 DIAGNOSIS — J20.9 ACUTE BRONCHITIS, UNSPECIFIED ORGANISM: ICD-10-CM

## 2023-04-06 DIAGNOSIS — U07.1 COVID-19: ICD-10-CM

## 2023-04-06 DIAGNOSIS — R06.02 SOB (SHORTNESS OF BREATH): ICD-10-CM

## 2023-04-06 DIAGNOSIS — R13.14 PHARYNGOESOPHAGEAL DYSPHAGIA: ICD-10-CM

## 2023-04-06 PROCEDURE — 3017F COLORECTAL CA SCREEN DOC REV: CPT | Performed by: FAMILY MEDICINE

## 2023-04-06 PROCEDURE — 99214 OFFICE O/P EST MOD 30 MIN: CPT | Performed by: FAMILY MEDICINE

## 2023-04-06 PROCEDURE — G8427 DOCREV CUR MEDS BY ELIG CLIN: HCPCS | Performed by: FAMILY MEDICINE

## 2023-04-06 PROCEDURE — G8419 CALC BMI OUT NRM PARAM NOF/U: HCPCS | Performed by: FAMILY MEDICINE

## 2023-04-06 PROCEDURE — 1036F TOBACCO NON-USER: CPT | Performed by: FAMILY MEDICINE

## 2023-04-06 RX ORDER — ALBUTEROL SULFATE 90 UG/1
2 AEROSOL, METERED RESPIRATORY (INHALATION) 4 TIMES DAILY PRN
Qty: 18 G | Refills: 5 | Status: SHIPPED | OUTPATIENT
Start: 2023-04-06

## 2023-04-06 RX ORDER — GUAIFENESIN AND CODEINE PHOSPHATE 100; 10 MG/5ML; MG/5ML
5 SOLUTION ORAL EVERY 4 HOURS PRN
Qty: 180 ML | Refills: 0 | Status: SHIPPED | OUTPATIENT
Start: 2023-04-06 | End: 2023-04-13

## 2023-04-06 RX ORDER — AZITHROMYCIN 250 MG/1
TABLET, FILM COATED ORAL
Qty: 1 PACKET | Refills: 0 | Status: SHIPPED | OUTPATIENT
Start: 2023-04-06

## 2023-04-06 RX ORDER — PANTOPRAZOLE SODIUM 40 MG/1
40 TABLET, DELAYED RELEASE ORAL
Qty: 90 TABLET | Refills: 1 | Status: SHIPPED | OUTPATIENT
Start: 2023-04-06

## 2023-04-06 SDOH — ECONOMIC STABILITY: FOOD INSECURITY: WITHIN THE PAST 12 MONTHS, THE FOOD YOU BOUGHT JUST DIDN'T LAST AND YOU DIDN'T HAVE MONEY TO GET MORE.: NEVER TRUE

## 2023-04-06 SDOH — ECONOMIC STABILITY: HOUSING INSECURITY
IN THE LAST 12 MONTHS, WAS THERE A TIME WHEN YOU DID NOT HAVE A STEADY PLACE TO SLEEP OR SLEPT IN A SHELTER (INCLUDING NOW)?: NO

## 2023-04-06 SDOH — ECONOMIC STABILITY: INCOME INSECURITY: HOW HARD IS IT FOR YOU TO PAY FOR THE VERY BASICS LIKE FOOD, HOUSING, MEDICAL CARE, AND HEATING?: NOT HARD AT ALL

## 2023-04-06 SDOH — ECONOMIC STABILITY: FOOD INSECURITY: WITHIN THE PAST 12 MONTHS, YOU WORRIED THAT YOUR FOOD WOULD RUN OUT BEFORE YOU GOT MONEY TO BUY MORE.: NEVER TRUE

## 2023-04-06 ASSESSMENT — ENCOUNTER SYMPTOMS
CHOKING: 1
SHORTNESS OF BREATH: 1
BLOOD IN STOOL: 0
DIARRHEA: 0
EYES NEGATIVE: 1
COUGH: 1
ABDOMINAL PAIN: 0
CONSTIPATION: 0
ALLERGIC/IMMUNOLOGIC NEGATIVE: 1

## 2023-04-06 ASSESSMENT — PATIENT HEALTH QUESTIONNAIRE - PHQ9
SUM OF ALL RESPONSES TO PHQ QUESTIONS 1-9: 0
SUM OF ALL RESPONSES TO PHQ QUESTIONS 1-9: 0
1. LITTLE INTEREST OR PLEASURE IN DOING THINGS: 0
SUM OF ALL RESPONSES TO PHQ QUESTIONS 1-9: 0
2. FEELING DOWN, DEPRESSED OR HOPELESS: 0
SUM OF ALL RESPONSES TO PHQ9 QUESTIONS 1 & 2: 0
SUM OF ALL RESPONSES TO PHQ QUESTIONS 1-9: 0

## 2023-04-06 NOTE — PROGRESS NOTES
MEDICATIONS    Current Outpatient Medications   Medication Sig Dispense Refill    albuterol sulfate HFA (VENTOLIN HFA) 108 (90 Base) MCG/ACT inhaler Inhale 2 puffs into the lungs 4 times daily as needed for Wheezing 18 g 5    pantoprazole (PROTONIX) 40 MG tablet Take 1 tablet by mouth every morning (before breakfast) 90 tablet 1    azithromycin (ZITHROMAX) 250 MG tablet Take 2 tabs (500 mg) on Day 1, and take 1 tab (250 mg) on days 2 through 5. 1 packet 0    guaiFENesin-codeine (CHERATUSSIN AC) 100-10 MG/5ML syrup Take 5 mLs by mouth every 4 hours as needed for Cough for up to 7 days. Max Daily Amount: 30 mLs 180 mL 0     No current facility-administered medications for this visit. ALLERGIES    No Known Allergies    PHYSICAL EXAM   Physical Exam  Vitals reviewed. Constitutional:       Appearance: She is well-developed. She is obese. HENT:      Head: Normocephalic. Mouth/Throat:      Comments: Large uvula, low palate, erythema  Eyes:      Pupils: Pupils are equal, round, and reactive to light. Neck:      Thyroid: No thyromegaly. Cardiovascular:      Rate and Rhythm: Normal rate and regular rhythm. Heart sounds: Normal heart sounds. No murmur heard. Pulmonary:      Effort: Pulmonary effort is normal.      Breath sounds: Normal breath sounds. No wheezing or rales. Abdominal:      Palpations: Abdomen is soft. Tenderness: There is no abdominal tenderness. There is no guarding or rebound. Musculoskeletal:         General: No tenderness or deformity. Normal range of motion. Cervical back: Normal range of motion and neck supple. Lymphadenopathy:      Cervical: No cervical adenopathy. Skin:     General: Skin is warm and dry. Neurological:      Mental Status: She is alert and oriented to person, place, and time. Psychiatric:         Mood and Affect: Mood normal.         Behavior: Behavior normal.         Thought Content:  Thought content normal.         Judgment: Judgment normal.

## 2023-06-12 PROBLEM — K63.5 COLON POLYPS: Status: ACTIVE | Noted: 2023-01-01

## 2023-06-12 PROBLEM — K44.9 HIATAL HERNIA: Status: ACTIVE | Noted: 2023-01-01

## 2023-09-05 ENCOUNTER — HOSPITAL ENCOUNTER (OUTPATIENT)
Age: 53
Setting detail: SPECIMEN
Discharge: HOME | End: 2023-09-05

## 2023-09-05 ENCOUNTER — OFFICE VISIT (OUTPATIENT)
Dept: FAMILY MEDICINE CLINIC | Age: 53
End: 2023-09-05
Payer: COMMERCIAL

## 2023-09-05 VITALS
BODY MASS INDEX: 35.81 KG/M2 | SYSTOLIC BLOOD PRESSURE: 117 MMHG | DIASTOLIC BLOOD PRESSURE: 75 MMHG | HEART RATE: 83 BPM | WEIGHT: 208.6 LBS

## 2023-09-05 DIAGNOSIS — R30.0 DYSURIA: ICD-10-CM

## 2023-09-05 DIAGNOSIS — T75.3XXA SEA SICKNESS, INITIAL ENCOUNTER: ICD-10-CM

## 2023-09-05 DIAGNOSIS — R30.0 DYSURIA: Primary | ICD-10-CM

## 2023-09-05 LAB
BILIRUB UR QL STRIP: NEGATIVE
BILIRUBIN, POC: NEGATIVE
BLOOD URINE, POC: NEGATIVE
CASTS #/AREA URNS LPF: NORMAL /LPF (ref 0–8)
CLARITY UR: CLEAR
CLARITY, POC: CLEAR
COLOR UR: YELLOW
COLOR, POC: YELLOW
EPI CELLS #/AREA URNS HPF: NORMAL /HPF (ref 0–5)
GLUCOSE UR STRIP-MCNC: NEGATIVE MG/DL
GLUCOSE URINE, POC: NEGATIVE
HGB UR QL STRIP.AUTO: NEGATIVE
KETONES UR STRIP-MCNC: NEGATIVE MG/DL
KETONES, POC: NEGATIVE
LEUKOCYTE EST, POC: NEGATIVE
LEUKOCYTE ESTERASE UR QL STRIP: ABNORMAL
NITRITE UR QL STRIP: NEGATIVE
NITRITE, POC: NEGATIVE
PH UR STRIP: 5 [PH] (ref 5–8)
PH, POC: 5.5
PROT UR STRIP-MCNC: NEGATIVE MG/DL
PROTEIN, POC: NEGATIVE
RBC #/AREA URNS HPF: NORMAL /HPF (ref 0–4)
SP GR UR STRIP: 1.02 (ref 1–1.03)
SPECIFIC GRAVITY, POC: >=1.03
UROBILINOGEN UR STRIP-ACNC: NORMAL EU/DL (ref 0–1)
UROBILINOGEN, POC: NORMAL
WBC #/AREA URNS HPF: NORMAL /HPF (ref 0–5)

## 2023-09-05 PROCEDURE — 99213 OFFICE O/P EST LOW 20 MIN: CPT | Performed by: FAMILY MEDICINE

## 2023-09-05 PROCEDURE — G8417 CALC BMI ABV UP PARAM F/U: HCPCS | Performed by: FAMILY MEDICINE

## 2023-09-05 PROCEDURE — G8427 DOCREV CUR MEDS BY ELIG CLIN: HCPCS | Performed by: FAMILY MEDICINE

## 2023-09-05 PROCEDURE — 81003 URINALYSIS AUTO W/O SCOPE: CPT | Performed by: FAMILY MEDICINE

## 2023-09-05 PROCEDURE — 1036F TOBACCO NON-USER: CPT | Performed by: FAMILY MEDICINE

## 2023-09-05 PROCEDURE — 3017F COLORECTAL CA SCREEN DOC REV: CPT | Performed by: FAMILY MEDICINE

## 2023-09-05 RX ORDER — SCOLOPAMINE TRANSDERMAL SYSTEM 1 MG/1
1 PATCH, EXTENDED RELEASE TRANSDERMAL
Qty: 3 PATCH | Refills: 0 | Status: SHIPPED | OUTPATIENT
Start: 2023-09-05

## 2023-09-05 RX ORDER — ONDANSETRON 4 MG/1
4 TABLET, ORALLY DISINTEGRATING ORAL 3 TIMES DAILY PRN
Qty: 21 TABLET | Refills: 0 | Status: SHIPPED | OUTPATIENT
Start: 2023-09-05

## 2023-09-05 ASSESSMENT — PATIENT HEALTH QUESTIONNAIRE - PHQ9
SUM OF ALL RESPONSES TO PHQ QUESTIONS 1-9: 0
SUM OF ALL RESPONSES TO PHQ9 QUESTIONS 1 & 2: 0
DEPRESSION UNABLE TO ASSESS: FUNCTIONAL CAPACITY MOTIVATION LIMITS ACCURACY
SUM OF ALL RESPONSES TO PHQ QUESTIONS 1-9: 0
SUM OF ALL RESPONSES TO PHQ QUESTIONS 1-9: 0
1. LITTLE INTEREST OR PLEASURE IN DOING THINGS: 0
2. FEELING DOWN, DEPRESSED OR HOPELESS: 0
SUM OF ALL RESPONSES TO PHQ QUESTIONS 1-9: 0

## 2023-09-05 NOTE — PROGRESS NOTES
[facility]  [facility] [address]  Dept: [phone]      Tahira Sanchez is a 48 y.o. female who presents today for follow up on her  medical conditions as noted below. Chief Complaint   Patient presents with    Urinary Frequency     Patient is here today due to UTI symptoms x 2weeks       Patient Active Problem List:     Hypoglycemia     Allergic rhinitis     Asthma     Obesity     Gastroesophageal reflux disease without esophagitis     COVID-19     Colon polyps     Hiatal hernia     Past Medical History:   Diagnosis Date    Allergic rhinitis     Asthma     Colon polyps 2023    adenomatous and hyperplastic    COVID-19 12/2021    vaccinated X 2. fatigue    Esophageal stricture 2023    Gastroesophageal reflux disease without esophagitis     Hiatal hernia 2023    Hypoglycemia     Obesity       Past Surgical History:   Procedure Laterality Date    DILATION AND CURETTAGE OF UTERUS  1996    spab     Family History   Problem Relation Age of Onset    Diabetes Mother     Coronary Art Dis Mother     High Blood Pressure Mother     Other Mother         covid    COPD Mother         nonsmoker    Cancer Father 66        testicular cancer?     COPD Father         smoker    High Blood Pressure Father     Vision Loss Father         Fuchs dystrophy of eye    Other Father         covid, on vent 4/2021, Fuchs dystrophy of eye    Colon Polyps Father     Other Sister         Fuchs dystrophy of eye    Cancer Brother         basal and squamous    Alcohol Abuse Brother         covid    Other Brother         Fuchs dystrophy of eye    Asthma Daughter        Current Outpatient Medications   Medication Sig Dispense Refill    scopolamine (TRANSDERM SCOP, 1.5 MG,) transdermal patch Place 1 patch onto the skin every 72 hours 3 patch 0    ondansetron (ZOFRAN-ODT) 4 MG disintegrating tablet Take 1 tablet by mouth 3 times daily as needed for Nausea or Vomiting

## 2023-11-13 DIAGNOSIS — Z78.0 MENOPAUSE: Primary | ICD-10-CM

## 2023-11-13 RX ORDER — PROGESTERONE 200 MG/1
CAPSULE ORAL
Qty: 36 CAPSULE | Refills: 3 | Status: SHIPPED | OUTPATIENT
Start: 2023-11-13 | End: 2024-01-04 | Stop reason: ALTCHOICE

## 2025-02-11 ENCOUNTER — OFFICE VISIT (OUTPATIENT)
Dept: FAMILY MEDICINE CLINIC | Age: 55
End: 2025-02-11

## 2025-02-11 VITALS
TEMPERATURE: 98 F | BODY MASS INDEX: 37.56 KG/M2 | HEIGHT: 64 IN | SYSTOLIC BLOOD PRESSURE: 120 MMHG | HEART RATE: 80 BPM | WEIGHT: 220 LBS | OXYGEN SATURATION: 98 % | DIASTOLIC BLOOD PRESSURE: 75 MMHG

## 2025-02-11 DIAGNOSIS — K21.9 GASTROESOPHAGEAL REFLUX DISEASE WITHOUT ESOPHAGITIS: ICD-10-CM

## 2025-02-11 DIAGNOSIS — Z78.0 MENOPAUSE: ICD-10-CM

## 2025-02-11 DIAGNOSIS — Z13.220 SCREENING FOR LIPID DISORDERS: ICD-10-CM

## 2025-02-11 DIAGNOSIS — R63.5 WEIGHT GAIN: ICD-10-CM

## 2025-02-11 DIAGNOSIS — Z12.31 ENCOUNTER FOR SCREENING MAMMOGRAM FOR MALIGNANT NEOPLASM OF BREAST: ICD-10-CM

## 2025-02-11 DIAGNOSIS — Z00.00 ANNUAL PHYSICAL EXAM: Primary | ICD-10-CM

## 2025-02-11 DIAGNOSIS — E66.09 CLASS 2 OBESITY DUE TO EXCESS CALORIES WITHOUT SERIOUS COMORBIDITY WITH BODY MASS INDEX (BMI) OF 37.0 TO 37.9 IN ADULT: ICD-10-CM

## 2025-02-11 DIAGNOSIS — J45.40 MODERATE PERSISTENT ASTHMA WITHOUT COMPLICATION: ICD-10-CM

## 2025-02-11 DIAGNOSIS — J30.89 NON-SEASONAL ALLERGIC RHINITIS, UNSPECIFIED TRIGGER: ICD-10-CM

## 2025-02-11 DIAGNOSIS — E66.812 CLASS 2 OBESITY DUE TO EXCESS CALORIES WITHOUT SERIOUS COMORBIDITY WITH BODY MASS INDEX (BMI) OF 37.0 TO 37.9 IN ADULT: ICD-10-CM

## 2025-02-11 LAB
ALBUMIN: 4.4 G/DL
ALK PHOSPHATASE: 60 U/L
ALPHA-1 ANTITRYPSIN: 134 MG/DL
ALT SERPL-CCNC: 33 U/L
AST SERPL-CCNC: 32 U/L
BASOPHILS ABSOLUTE: 0.06 X10^3UL
BASOPHILS RELATIVE PERCENT: 0.8 %
BILIRUB SERPL-MCNC: 0.6 MG/DL
BUN BLDV-MCNC: 14 MG/DL
CALCIUM SERPL-MCNC: 9.4 MG/DL
CHLORIDE BLD-SCNC: 113 MMOL/L
CHOLESTEROL, TOTAL: 176 MG/DL
CHOLESTEROL/HDL RATIO: 4.1
CO2: 20 MMOL/L
CREAT SERPL-MCNC: 0.77 MG/DL
EGFR (CKD-EPI): 91.6 ML/M1.7
EOSINOPHILS ABSOLUTE: 0.17 X10^3UL
EOSINOPHILS RELATIVE PERCENT: 2.1 %
ERYTHROCYTE [DISTWIDTH] IN BLOOD BY AUTOMATED COUNT: 47.1 FL
ESTIMATED AVERAGE GLUCOSE: 111 MG/DL
FREE T4 REFLEX: NO
GLUCOSE: 103 MG/DL
HBA1C MFR BLD: 5.5 %
HCT VFR BLD CALC: 46.4 %
HDLC SERPL-MCNC: 43 MG/DL
HEMOGLOBIN: 15.6 G/DL
IMMATURE GRANULOCYTES %: 0.1 %
IMMATURE GRANULOCYTES ABSOLUTE: 0.01 X10^3UL
LDL CHOLESTEROL: 108 MG/DL
LYMPHOCYTES ABSOLUTE: 3.27 X10^3UL
LYMPHOCYTES RELATIVE PERCENT: 41.2 %
MCH RBC QN AUTO: 31.9 PG
MCHC RBC AUTO-ENTMCNC: 33.6 G/DL
MCV RBC AUTO: 94.9 FL
MONOCYTES ABSOLUTE: 0.71 X10^3UL
MONOCYTES RELATIVE PERCENT: 8.9 %
NEUTROPHILS ABSOLUTE: 3.72 X10^3UL
NEUTROPHILS RELATIVE PERCENT: 46.9 %
PLATELET # BLD: 258 X10^3UL
POTASSIUM SERPL-SCNC: 4.5 MMOL/L
RBC # BLD: 4.89 X10^6UL
SODIUM BLD-SCNC: 142 MMOL/L
TOTAL PROTEIN: 7.9 G/DL
TRIGL SERPL-MCNC: 125 MG/DL
TSH SERPL DL<=0.05 MIU/L-ACNC: 4.4 MIU/L
VITAMIN D 25-HYDROXY: 25.6 NG/ML
VLDLC SERPL CALC-MCNC: 25 MG/DL
WBC # BLD: 7.94 X10^3UL

## 2025-02-11 RX ORDER — PANTOPRAZOLE SODIUM 20 MG/1
20 TABLET, DELAYED RELEASE ORAL
Qty: 90 TABLET | Refills: 1 | Status: SHIPPED | OUTPATIENT
Start: 2025-02-11

## 2025-02-11 RX ORDER — FEXOFENADINE HCL 180 MG/1
180 TABLET ORAL DAILY
Qty: 90 TABLET | Refills: 1 | Status: SHIPPED | OUTPATIENT
Start: 2025-02-11

## 2025-02-11 RX ORDER — LEVALBUTEROL TARTRATE 45 UG/1
1 AEROSOL, METERED ORAL EVERY 4 HOURS PRN
Qty: 1 EACH | Refills: 3 | Status: SHIPPED | OUTPATIENT
Start: 2025-02-11 | End: 2026-02-11

## 2025-02-11 RX ORDER — FLUTICASONE PROPIONATE AND SALMETEROL 250; 50 UG/1; UG/1
1 POWDER RESPIRATORY (INHALATION) EVERY 12 HOURS
Qty: 60 EACH | Refills: 3 | Status: SHIPPED | OUTPATIENT
Start: 2025-02-11

## 2025-02-11 RX ORDER — ESTROGEN,CON/M-PROGEST ACET 0.625-2.5
1 TABLET ORAL DAILY
Qty: 28 TABLET | Refills: 3 | Status: SHIPPED | OUTPATIENT
Start: 2025-02-11

## 2025-02-11 SDOH — ECONOMIC STABILITY: FOOD INSECURITY: WITHIN THE PAST 12 MONTHS, THE FOOD YOU BOUGHT JUST DIDN'T LAST AND YOU DIDN'T HAVE MONEY TO GET MORE.: PATIENT DECLINED

## 2025-02-11 SDOH — ECONOMIC STABILITY: FOOD INSECURITY: WITHIN THE PAST 12 MONTHS, YOU WORRIED THAT YOUR FOOD WOULD RUN OUT BEFORE YOU GOT MONEY TO BUY MORE.: PATIENT DECLINED

## 2025-02-11 ASSESSMENT — PATIENT HEALTH QUESTIONNAIRE - PHQ9
SUM OF ALL RESPONSES TO PHQ9 QUESTIONS 1 & 2: 0
SUM OF ALL RESPONSES TO PHQ QUESTIONS 1-9: 0
2. FEELING DOWN, DEPRESSED OR HOPELESS: NOT AT ALL
SUM OF ALL RESPONSES TO PHQ QUESTIONS 1-9: 0
1. LITTLE INTEREST OR PLEASURE IN DOING THINGS: NOT AT ALL

## 2025-02-11 NOTE — PROGRESS NOTES
180 MG tablet Take 1 tablet by mouth daily 90 tablet 1    fluticasone-salmeterol (ADVAIR) 250-50 MCG/ACT AEPB diskus inhaler Inhale 1 puff into the lungs in the morning and 1 puff in the evening. 60 each 3    scopolamine (TRANSDERM SCOP, 1.5 MG,) transdermal patch Place 1 patch onto the skin every 72 hours 3 patch 0    ondansetron (ZOFRAN-ODT) 4 MG disintegrating tablet Take 1 tablet by mouth 3 times daily as needed for Nausea or Vomiting 21 tablet 0    montelukast (SINGULAIR) 10 MG tablet Take 1 tablet by mouth nightly 90 tablet 1     No current facility-administered medications for this visit.       ALLERGIES    No Known Allergies    PHYSICAL EXAM   Physical Exam  Vitals reviewed.   Constitutional:       Appearance: She is well-developed. She is obese.   HENT:      Head: Normocephalic.   Eyes:      Pupils: Pupils are equal, round, and reactive to light.   Neck:      Thyroid: No thyromegaly.   Cardiovascular:      Rate and Rhythm: Normal rate and regular rhythm.      Heart sounds: Normal heart sounds. No murmur heard.  Pulmonary:      Effort: Pulmonary effort is normal.      Breath sounds: Normal breath sounds. No wheezing or rales.   Abdominal:      Palpations: Abdomen is soft.      Tenderness: There is no abdominal tenderness. There is no guarding or rebound.   Musculoskeletal:         General: No tenderness or deformity. Normal range of motion.      Cervical back: Normal range of motion and neck supple.   Lymphadenopathy:      Cervical: No cervical adenopathy.   Skin:     General: Skin is warm and dry.   Neurological:      Mental Status: She is alert and oriented to person, place, and time.   Psychiatric:         Mood and Affect: Mood normal.         Behavior: Behavior normal.         Thought Content: Thought content normal.         Judgment: Judgment normal.       Physical Exam  Lungs were auscultated.    Vital Signs  Pulse oximetry reading is 98.    ASSESSMENT/PLAN    1. Annual physical exam  Encouraged

## 2025-02-24 DIAGNOSIS — J45.40 MODERATE PERSISTENT ASTHMA WITHOUT COMPLICATION: ICD-10-CM

## 2025-02-25 ENCOUNTER — TELEPHONE (OUTPATIENT)
Dept: FAMILY MEDICINE CLINIC | Age: 55
End: 2025-02-25

## 2025-02-25 RX ORDER — MONTELUKAST SODIUM 10 MG/1
10 TABLET ORAL NIGHTLY
Qty: 90 TABLET | Refills: 1 | Status: SHIPPED | OUTPATIENT
Start: 2025-02-25

## 2025-02-25 NOTE — TELEPHONE ENCOUNTER
Patient is having issues with deep cough and green phelgm cough , fever, chills, concern for bronchitis x 1 week. Patient has tried OTC Nyquil and Dayquil to reduce fever. Patient has tested negative for covid,

## 2025-02-25 NOTE — TELEPHONE ENCOUNTER
Patient comment: I think this was supposed to be on my list of meds. I know we talked about it. I was planning to be a better patient and take it regularly :)       Taya German is calling to request a refill on the following medication(s):    Last Visit Date (If Applicable):  2/11/2025    Next Visit Date:    Visit date not found    Medication Request:  Requested Prescriptions     Pending Prescriptions Disp Refills    montelukast (SINGULAIR) 10 MG tablet 90 tablet 1     Sig: Take 1 tablet by mouth nightly

## 2025-02-26 ENCOUNTER — OFFICE VISIT (OUTPATIENT)
Dept: FAMILY MEDICINE CLINIC | Age: 55
End: 2025-02-26

## 2025-02-26 VITALS
HEIGHT: 64 IN | WEIGHT: 210 LBS | SYSTOLIC BLOOD PRESSURE: 149 MMHG | DIASTOLIC BLOOD PRESSURE: 92 MMHG | OXYGEN SATURATION: 98 % | BODY MASS INDEX: 35.85 KG/M2 | HEART RATE: 106 BPM | TEMPERATURE: 98.6 F

## 2025-02-26 DIAGNOSIS — J22 LOWER RESPIRATORY INFECTION: ICD-10-CM

## 2025-02-26 DIAGNOSIS — J45.20 MILD INTERMITTENT ASTHMA WITHOUT COMPLICATION: ICD-10-CM

## 2025-02-26 DIAGNOSIS — R05.1 ACUTE COUGH: Primary | ICD-10-CM

## 2025-02-26 RX ORDER — LEVALBUTEROL INHALATION SOLUTION 1.25 MG/3ML
1.25 SOLUTION RESPIRATORY (INHALATION) EVERY 4 HOURS PRN
Qty: 3 ML | Refills: 2 | Status: SHIPPED | OUTPATIENT
Start: 2025-02-26

## 2025-02-26 RX ORDER — AZITHROMYCIN 250 MG/1
TABLET, FILM COATED ORAL
Qty: 10 TABLET | Refills: 0 | Status: CANCELLED | OUTPATIENT
Start: 2025-02-26 | End: 2025-03-08

## 2025-02-26 ASSESSMENT — ENCOUNTER SYMPTOMS
VOICE CHANGE: 1
DIARRHEA: 0
NAUSEA: 0
SORE THROAT: 0
SINUS PAIN: 0
TROUBLE SWALLOWING: 0
CHEST TIGHTNESS: 1
SINUS PRESSURE: 0
SHORTNESS OF BREATH: 1

## 2025-02-26 ASSESSMENT — PATIENT HEALTH QUESTIONNAIRE - PHQ9
SUM OF ALL RESPONSES TO PHQ QUESTIONS 1-9: 0
1. LITTLE INTEREST OR PLEASURE IN DOING THINGS: NOT AT ALL
SUM OF ALL RESPONSES TO PHQ QUESTIONS 1-9: 0
2. FEELING DOWN, DEPRESSED OR HOPELESS: NOT AT ALL
SUM OF ALL RESPONSES TO PHQ QUESTIONS 1-9: 0
SUM OF ALL RESPONSES TO PHQ QUESTIONS 1-9: 0
SUM OF ALL RESPONSES TO PHQ9 QUESTIONS 1 & 2: 0

## 2025-02-26 NOTE — PROGRESS NOTES
person, place, and time. Mental status is at baseline.   Psychiatric:         Mood and Affect: Mood normal.         Behavior: Behavior normal.         Diagnoses / Plan:   1. Acute cough  -     XR CHEST STANDARD (2 VW); Future  2. Mild intermittent asthma without complication  -     XR CHEST STANDARD (2 VW); Future  -     levalbuterol (XOPENEX) 1.25 MG/3ML nebulizer solution; Take 3 mLs by nebulization every 4 hours as needed for Wheezing, Disp-3 mL, R-2Normal  -     DME Order for Nebulizer as OP  3. Lower respiratory infection  -     XR CHEST STANDARD (2 VW); Future       Discussed likely viral, possible Influenza A, given her symptoms and frequency of local infections recently. Unfortunately office does not have tests in stock, however she is out of range for Tamiflu at this point.   Xopenex solution sent to pharmacy. Encouraged use with nebulizer she has at home. New order placed for nebulizer as current machine is old.   Chest Xray ordered. Will review and treat accordingly.   Also encouraged patient to  Xopenex and Advair inhalers ordered at her last visit and resume routine maintenance inhaler.  Encouraged efforts at healthy lifestyle with nutritious diet and regular physical activity.   Understanding and agreement was voiced with all above plans. All questions answered to satisfaction.   Call office with any questions or new or worsening symptoms or concerns.     Return if symptoms worsen or fail to improve.      The patient (or guardian, if applicable) and other individuals in attendance with the patient were advised that Artificial Intelligence will be utilized during this visit to record, process the conversation to generate a clinical note, and support improvement of the AI technology. The patient (or guardian, if applicable) and other individuals in attendance at the appointment consented to the use of AI, including the recording.        Electronically signed by RADHA BUENROSTRO CNP on

## 2025-02-27 DIAGNOSIS — J45.20 MILD INTERMITTENT ASTHMA WITHOUT COMPLICATION: ICD-10-CM

## 2025-02-27 DIAGNOSIS — J22 LOWER RESPIRATORY INFECTION: ICD-10-CM

## 2025-02-27 DIAGNOSIS — J18.9 PNEUMONIA OF RIGHT MIDDLE LOBE DUE TO INFECTIOUS ORGANISM: Primary | ICD-10-CM

## 2025-02-27 DIAGNOSIS — R05.1 ACUTE COUGH: ICD-10-CM

## 2025-02-27 RX ORDER — AZITHROMYCIN 250 MG/1
TABLET, FILM COATED ORAL
Qty: 6 TABLET | Refills: 0 | Status: SHIPPED | OUTPATIENT
Start: 2025-02-27 | End: 2025-03-09

## 2025-02-27 RX ORDER — AMOXICILLIN 500 MG/1
1000 CAPSULE ORAL 2 TIMES DAILY
Qty: 28 CAPSULE | Refills: 0 | Status: SHIPPED | OUTPATIENT
Start: 2025-02-27 | End: 2025-03-06

## 2025-04-08 ENCOUNTER — TELEPHONE (OUTPATIENT)
Dept: FAMILY MEDICINE CLINIC | Age: 55
End: 2025-04-08